# Patient Record
Sex: FEMALE | Race: WHITE | Employment: FULL TIME | URBAN - NONMETROPOLITAN AREA
[De-identification: names, ages, dates, MRNs, and addresses within clinical notes are randomized per-mention and may not be internally consistent; named-entity substitution may affect disease eponyms.]

---

## 2022-03-31 ENCOUNTER — OFFICE VISIT (OUTPATIENT)
Dept: ORTHOPEDIC SURGERY | Age: 57
End: 2022-03-31
Payer: COMMERCIAL

## 2022-03-31 VITALS — HEIGHT: 64 IN | BODY MASS INDEX: 32.44 KG/M2 | WEIGHT: 190 LBS

## 2022-03-31 DIAGNOSIS — M25.512 ACUTE PAIN OF LEFT SHOULDER: Primary | ICD-10-CM

## 2022-03-31 PROCEDURE — 99203 OFFICE O/P NEW LOW 30 MIN: CPT | Performed by: ORTHOPAEDIC SURGERY

## 2022-03-31 RX ORDER — PREDNISONE 1 MG/1
TABLET ORAL
Qty: 55 TABLET | Refills: 0 | Status: SHIPPED | OUTPATIENT
Start: 2022-03-31

## 2022-04-01 NOTE — PROGRESS NOTES
file    Lack of Transportation (Non-Medical): Not on file   Physical Activity:     Days of Exercise per Week: Not on file    Minutes of Exercise per Session: Not on file   Stress:     Feeling of Stress : Not on file   Social Connections:     Frequency of Communication with Friends and Family: Not on file    Frequency of Social Gatherings with Friends and Family: Not on file    Attends Yarsani Services: Not on file    Active Member of [address] [place] [address] or Organizations: Not on file    Attends Club or Organization Meetings: Not on file    Marital Status: Not on file   Intimate Partner Violence:     Fear of Current or Ex-Partner: Not on file    Emotionally Abused: Not on file    Physically Abused: Not on file    Sexually Abused: Not on file   Housing Stability:     Unable to Pay for Housing in the Last Year: Not on file    Number of [place] in the Last Year: Not on file    Unstable Housing in the Last Year: Not on file       Family HISTORY    No family history on file. PHYSICAL EXAM    Vital Signs:  Ht 5' 4\" (1.626 m)   Wt 190 lb (86.2 kg)   BMI 32.61 kg/m²   General Appearance:  Normal body habitus. Alert and oriented to person, place, and time. Affect:  Normal.   Skin:  Intact. Sensation:  Intact. Strength:  Intact. Reflexes:  Intact. Pulses:  Intact. Shoulder Exam  She has a full range of motion of the neck. Examination of the shoulder reveals: Fairly good active and passive range of motion of hip for good integrity of the rotator cuff but moderate impingement sign pain with adduction and with apprehension type maneuver. She has a sense of instability with manipulation of the shoulder. Otherwise her neurocirculatory lymphatic exam is normal symmetric to both upper extremities. She has no swelling or bruising that I can detect. She has no tenderness over the proximal biceps. She has a full active range of motion of the elbow, wrist and hand.      Range of motion  (in degrees) Right Left   ABDUCTION       EXT. ROTATION       INT. ROTATION       FORWARD FLEX    STRENGTH         Provocative Test Positive Negative Not indicated   Spurling Sign [] [x] []   Cross Arm Adduction Test [x] [] []   Apprehension Sign [] [] [x]   Neer Sign [] [] []   Aburto Impingement Sign [x] [] []   Yergason test [] [] []   ONiless test [] [] []     Other Provocative tests:     IMAGING STUDIES    X-rays 2 views of the left shoulder taken today are unremarkable for acute or chronic pathology    IMPRESSION    Left shoulder pain probable labral tear    PLAN    1. Conservative care options including medicines and therapy were discussed. 2.  The indications for therapeutic injections were discussed. 3.  The indications for additional imaging studies were discussed. 4.  After considering the various options discussed, the patient elected to pursue a course that includes a 10-day prednisone taper, and I will place her in an aggressive physician directed therapy program.  If she is not substantial improve over the the next few weeks I would recommend an MRI of the left shoulder to determine the extent of her labral tearing.

## 2023-11-17 ENCOUNTER — OFFICE (OUTPATIENT)
Dept: URBAN - METROPOLITAN AREA CLINIC 86 | Facility: CLINIC | Age: 58
Setting detail: OPHTHALMOLOGY
End: 2023-11-17
Payer: COMMERCIAL

## 2023-11-17 DIAGNOSIS — E11.9: ICD-10-CM

## 2023-11-17 DIAGNOSIS — H16.223: ICD-10-CM

## 2023-11-17 DIAGNOSIS — H52.4: ICD-10-CM

## 2023-11-17 DIAGNOSIS — H25.13: ICD-10-CM

## 2023-11-17 PROCEDURE — 92015 DETERMINE REFRACTIVE STATE: CPT | Performed by: OPHTHALMOLOGY

## 2023-11-17 PROCEDURE — 92014 COMPRE OPH EXAM EST PT 1/>: CPT | Performed by: OPHTHALMOLOGY

## 2023-11-17 ASSESSMENT — REFRACTION_AUTOREFRACTION
OD_SPHERE: -4.00
OD_CYLINDER: +1.25
OS_SPHERE: -3.75
OS_CYLINDER: +1.00
OD_AXIS: 142
OS_AXIS: 170

## 2023-11-17 ASSESSMENT — REFRACTION_MANIFEST
OD_VA1: 20/20
OS_ADD: +1.75
OD_AXIS: 142
OS_AXIS: 170
OD_ADD: +1.75
OD_SPHERE: -4.00
OS_VA1: 20/20
OS_SPHERE: -3.75
OS_CYLINDER: +1.00
OD_CYLINDER: +1.25

## 2023-11-17 ASSESSMENT — SPHEQUIV_DERIVED
OS_SPHEQUIV: -3.25
OD_SPHEQUIV: -3.375
OD_SPHEQUIV: -3.375
OS_SPHEQUIV: -3.25

## 2023-11-17 ASSESSMENT — TEAR BREAK UP TIME (TBUT)
OS_TBUT: T
OD_TBUT: T

## 2023-11-17 ASSESSMENT — CONFRONTATIONAL VISUAL FIELD TEST (CVF)
OS_FINDINGS: FULL
OD_FINDINGS: FULL

## 2024-11-18 ENCOUNTER — OFFICE (OUTPATIENT)
Dept: URBAN - METROPOLITAN AREA CLINIC 86 | Facility: CLINIC | Age: 59
Setting detail: OPHTHALMOLOGY
End: 2024-11-18
Payer: MEDICAID

## 2024-11-18 DIAGNOSIS — H16.223: ICD-10-CM

## 2024-11-18 DIAGNOSIS — H25.13: ICD-10-CM

## 2024-11-18 DIAGNOSIS — E11.9: ICD-10-CM

## 2024-11-18 PROCEDURE — 92014 COMPRE OPH EXAM EST PT 1/>: CPT | Performed by: OPHTHALMOLOGY

## 2024-11-18 ASSESSMENT — REFRACTION_AUTOREFRACTION
OD_CYLINDER: +1.25
OD_AXIS: 142
OD_SPHERE: -4.00
OS_AXIS: 170
OS_SPHERE: -3.75
OS_CYLINDER: +1.00

## 2024-11-18 ASSESSMENT — REFRACTION_MANIFEST
OD_VA1: 20/20
OD_SPHERE: -4.00
OS_ADD: +1.75
OD_ADD: +1.75
OS_VA1: 20/20
OS_SPHERE: -3.75
OD_AXIS: 142
OS_AXIS: 170
OS_CYLINDER: +1.00
OD_CYLINDER: +1.25

## 2024-11-18 ASSESSMENT — TEAR BREAK UP TIME (TBUT)
OS_TBUT: T
OD_TBUT: T

## 2024-11-18 ASSESSMENT — VISUAL ACUITY
OD_BCVA: 20/30-
OS_BCVA: 20/30-

## 2024-11-18 ASSESSMENT — CONFRONTATIONAL VISUAL FIELD TEST (CVF)
OD_FINDINGS: FULL
OS_FINDINGS: FULL